# Patient Record
Sex: MALE | Race: WHITE | Employment: UNEMPLOYED | URBAN - METROPOLITAN AREA
[De-identification: names, ages, dates, MRNs, and addresses within clinical notes are randomized per-mention and may not be internally consistent; named-entity substitution may affect disease eponyms.]

---

## 2024-01-25 ENCOUNTER — OFFICE VISIT (OUTPATIENT)
Age: 44
End: 2024-01-25

## 2024-01-25 VITALS
SYSTOLIC BLOOD PRESSURE: 135 MMHG | WEIGHT: 212.6 LBS | HEIGHT: 72 IN | DIASTOLIC BLOOD PRESSURE: 90 MMHG | HEART RATE: 100 BPM | TEMPERATURE: 98.7 F | OXYGEN SATURATION: 94 % | BODY MASS INDEX: 28.79 KG/M2

## 2024-01-25 DIAGNOSIS — R05.1 ACUTE COUGH: Primary | ICD-10-CM

## 2024-01-25 LAB
INFLUENZA A ANTIBODY: NEGATIVE
INFLUENZA B ANTIBODY: NEGATIVE
Lab: NORMAL
QC PASS/FAIL: NORMAL
SARS-COV-2 RDRP RESP QL NAA+PROBE: NEGATIVE

## 2024-01-25 RX ORDER — ONDANSETRON HYDROCHLORIDE 8 MG/1
8 TABLET, FILM COATED ORAL EVERY 8 HOURS PRN
Qty: 12 TABLET | Refills: 0 | Status: SHIPPED | OUTPATIENT
Start: 2024-01-25

## 2024-01-25 RX ORDER — DOXYCYCLINE HYCLATE 100 MG
100 TABLET ORAL 2 TIMES DAILY
Qty: 14 TABLET | Refills: 0 | Status: SHIPPED | OUTPATIENT
Start: 2024-01-25 | End: 2024-02-01

## 2024-01-25 RX ORDER — LISINOPRIL 40 MG/1
40 TABLET ORAL DAILY
COMMUNITY

## 2024-01-25 RX ORDER — HYDROCHLOROTHIAZIDE 25 MG/1
25 TABLET ORAL DAILY
COMMUNITY

## 2024-01-25 RX ORDER — PREDNISONE 20 MG/1
20 TABLET ORAL DAILY
Qty: 5 TABLET | Refills: 0 | Status: SHIPPED | OUTPATIENT
Start: 2024-01-25 | End: 2024-01-30

## 2024-01-25 ASSESSMENT — ENCOUNTER SYMPTOMS
VOMITING: 1
NAUSEA: 1
COUGH: 1

## 2024-01-25 NOTE — PATIENT INSTRUCTIONS
Thank you for allowing us to care for you today and we hope you feel better soon  Tylenol/Motrin as needed for fever and discomfort  New Prescriptions    DOXYCYCLINE HYCLATE (VIBRA-TABS) 100 MG TABLET    Take 1 tablet by mouth 2 times daily for 7 days    ONDANSETRON (ZOFRAN) 8 MG TABLET    Take 1 tablet by mouth every 8 hours as needed for Nausea or Vomiting    PREDNISONE (DELTASONE) 20 MG TABLET    Take 1 tablet by mouth daily for 5 days

## 2024-01-25 NOTE — PROGRESS NOTES
Russell J Behymer (:  1980) is a 43 y.o. male,New patient, here for evaluation of the following chief complaint(s):  Cough (PT. C/O COUGH, MID UPPER CHEST PAINS, NOT ABLE TO KEEP ANYTHING DOWN LIQUIDS/SOLIDS, CHILLS, GENERALIZED BODY ACHES X 2 DAYS.)      ASSESSMENT/PLAN:    ICD-10-CM    1. Acute cough  R05.1 POCT Influenza A/B        Results for POC orders placed in visit on 24   POCT Influenza A/B   Result Value Ref Range    Influenza A Ab NEGATIVE     Influenza B Ab NEGATIVE         Dx Diff:Influenza, gastroenteritis, sinusitis   Education and handout provided on diagnosis and management of symptoms.   AVS reviewed with patient. Follow up as needed in UC or with PCP for new or worsening symptoms.   Return if symptoms worsen or fail to improve.    SUBJECTIVE/OBJECTIVE:  Patient presents today with complaints of flu like symptoms that started 2 days ago       History provided by:  Patient   used: No    Cough  Associated symptoms include chills and a fever.       Vitals:    24 1502   BP: (!) 135/90   Site: Left Upper Arm   Position: Sitting   Cuff Size: Large Adult   Pulse: 100   Temp: 98.7 °F (37.1 °C)   TempSrc: Oral   SpO2: 94%   Weight: 96.4 kg (212 lb 9.6 oz)   Height: 1.829 m (6')       Review of Systems   Constitutional:  Positive for chills and fever.   HENT:  Positive for congestion.    Respiratory:  Positive for cough.    Gastrointestinal:  Positive for nausea and vomiting.       Physical Exam  Constitutional:       Appearance: Normal appearance. He is normal weight.   HENT:      Head: Normocephalic.      Nose: Nose normal.      Mouth/Throat:      Mouth: Mucous membranes are moist.      Pharynx: Oropharynx is clear.   Cardiovascular:      Rate and Rhythm: Regular rhythm. Tachycardia present.      Heart sounds: Normal heart sounds.   Pulmonary:      Effort: Pulmonary effort is normal.      Breath sounds: Normal breath sounds.   Abdominal:      General: Abdomen is

## 2024-04-27 ENCOUNTER — HOSPITAL ENCOUNTER (EMERGENCY)
Age: 44
Discharge: HOME | End: 2024-04-27
Payer: COMMERCIAL

## 2024-04-27 VITALS
TEMPERATURE: 97.8 F | OXYGEN SATURATION: 99 % | DIASTOLIC BLOOD PRESSURE: 80 MMHG | SYSTOLIC BLOOD PRESSURE: 126 MMHG | RESPIRATION RATE: 16 BRPM | HEART RATE: 76 BPM

## 2024-04-27 DIAGNOSIS — S39.012A STRAIN OF LUMBAR REGION, INITIAL ENCOUNTER: Primary | ICD-10-CM

## 2024-04-27 PROCEDURE — 99283 EMERGENCY DEPT VISIT LOW MDM: CPT

## 2024-04-27 PROCEDURE — 6370000000 HC RX 637 (ALT 250 FOR IP): Performed by: PHYSICIAN ASSISTANT

## 2024-04-27 RX ORDER — CYCLOBENZAPRINE HCL 5 MG
5 TABLET ORAL 2 TIMES DAILY PRN
Qty: 10 TABLET | Refills: 0 | Status: SHIPPED | OUTPATIENT
Start: 2024-04-27 | End: 2024-05-07

## 2024-04-27 RX ORDER — IBUPROFEN 600 MG/1
600 TABLET ORAL EVERY 6 HOURS PRN
Qty: 30 TABLET | Refills: 0 | Status: SHIPPED | OUTPATIENT
Start: 2024-04-27

## 2024-04-27 RX ORDER — OXYCODONE HYDROCHLORIDE AND ACETAMINOPHEN 5; 325 MG/1; MG/1
1 TABLET ORAL ONCE
Status: COMPLETED | OUTPATIENT
Start: 2024-04-27 | End: 2024-04-27

## 2024-04-27 RX ADMIN — OXYCODONE AND ACETAMINOPHEN 1 TABLET: 5; 325 TABLET ORAL at 11:17

## 2024-04-27 ASSESSMENT — PAIN SCALES - GENERAL
PAINLEVEL_OUTOF10: 10
PAINLEVEL_OUTOF10: 7

## 2024-04-27 ASSESSMENT — PAIN - FUNCTIONAL ASSESSMENT
PAIN_FUNCTIONAL_ASSESSMENT: 0-10
PAIN_FUNCTIONAL_ASSESSMENT: 0-10

## 2024-04-27 ASSESSMENT — PAIN DESCRIPTION - LOCATION: LOCATION: BACK

## 2024-04-27 NOTE — ED PROVIDER NOTES
**ADVANCED PRACTICE PROVIDER, I HAVE EVALUATED THIS PATIENT**        [facility] ED  EMERGENCY DEPARTMENT ENCOUNTER      Pt Name: Russell J Behymer  MRN:[id number]  Birthdate 1980  Date of evaluation: 4/27/2024  Provider: Gustavo Olson PA-C  Note Started: 11:05 AM EDT 4/27/24        Chief Complaint:    Chief Complaint   Patient presents with    Back Pain     C/o low back pain x 5 days. No known injury         Nursing Notes, Past Medical Hx, Past Surgical Hx, Social Hx, Allergies, and Family Hx were all reviewed and agreed with or any disagreements were addressed in the HPI.    HPI: (Location, Duration, Timing, Severity, Quality, Assoc Sx, Context, Modifying factors)    History From: patient   Limitations to history : None    Social Determinants Significantly Affecting Health : None    Chief Complaint of back pain, left sided     This is a  43 y.o. male who is presenting to the emergency department with left lower back pain.  He points to pain over the left side of the paraspinal area near the L4-L5 area on the low back.  He states symptoms have been present since Tuesday; he cannot identify any eliciting factors or specific injuries or could have occurred.  He states remotely 1 year ago he had a low back injury where he felt a crack and a pop specifically in the current area he is having pain as of today.  He states he works third shift and does do a lot of standing.  He reports getting in and out of the vehicle is difficult or going from a sitting to a standing position is very difficult as well.  He denies any radiation of the pain or numbness to the lower extremity.  Denies any bowel or bladder dysfunction.  He denies any fevers chills or sweats.  He states he has been taking 600 mg of ibuprofen at home without any relief relief.  He states when he woke up this morning the pain felt worse.  He states the pain does get worse throughout the day.    PastMedical/Surgical History:  No past medical